# Patient Record
Sex: MALE | Race: WHITE | Employment: OTHER | URBAN - METROPOLITAN AREA
[De-identification: names, ages, dates, MRNs, and addresses within clinical notes are randomized per-mention and may not be internally consistent; named-entity substitution may affect disease eponyms.]

---

## 2023-02-09 ENCOUNTER — HOSPITAL ENCOUNTER (EMERGENCY)
Age: 78
Discharge: HOME | End: 2023-02-09
Attending: EMERGENCY MEDICINE
Payer: OTHER GOVERNMENT

## 2023-02-09 VITALS
OXYGEN SATURATION: 95 % | HEART RATE: 70 BPM | BODY MASS INDEX: 25.76 KG/M2 | DIASTOLIC BLOOD PRESSURE: 99 MMHG | HEIGHT: 68 IN | TEMPERATURE: 98.8 F | RESPIRATION RATE: 20 BRPM | SYSTOLIC BLOOD PRESSURE: 150 MMHG | WEIGHT: 170 LBS

## 2023-02-09 DIAGNOSIS — M79.661 RIGHT CALF PAIN: Primary | ICD-10-CM

## 2023-02-09 PROCEDURE — 99283 EMERGENCY DEPT VISIT LOW MDM: CPT

## 2023-02-09 RX ORDER — HYDROCODONE BITARTRATE AND ACETAMINOPHEN 5; 325 MG/1; MG/1
1 TABLET ORAL EVERY 6 HOURS PRN
Qty: 10 TABLET | Refills: 0 | Status: SHIPPED | OUTPATIENT
Start: 2023-02-09 | End: 2023-02-12

## 2023-02-09 ASSESSMENT — PAIN - FUNCTIONAL ASSESSMENT
PAIN_FUNCTIONAL_ASSESSMENT: NONE - DENIES PAIN
PAIN_FUNCTIONAL_ASSESSMENT: 0-10

## 2023-02-09 ASSESSMENT — PAIN SCALES - GENERAL: PAINLEVEL_OUTOF10: 8

## 2023-02-09 ASSESSMENT — LIFESTYLE VARIABLES
HOW OFTEN DO YOU HAVE A DRINK CONTAINING ALCOHOL: NEVER
HOW MANY STANDARD DRINKS CONTAINING ALCOHOL DO YOU HAVE ON A TYPICAL DAY: PATIENT DOES NOT DRINK

## 2023-02-09 ASSESSMENT — ENCOUNTER SYMPTOMS
RESPIRATORY NEGATIVE: 1
GASTROINTESTINAL NEGATIVE: 1

## 2023-02-09 NOTE — ED TRIAGE NOTES
Pain in the right leg. Pain in the calf in the leg. Patient takes elevated BP medication. Patient took all med's today. US taken about a month ago.

## 2023-02-09 NOTE — DISCHARGE INSTRUCTIONS
Call the [facility] for close follow-up. Also call [facility] orthopedics for follow-up. Return if worsening.

## 2023-02-09 NOTE — ED NOTES
I have reviewed discharge instructions with the patient. The patient verbalized understanding. Patient left ED via Discharge Method: ambulatory to Home with (insert name of family/friend, self, transport family). Opportunity for questions and clarification provided. Patient given 1 scripts. To continue your aftercare when you leave the hospital, you may receive an automated call from our care team to check in on how you are doing. This is a free service and part of our promise to provide the best care and service to meet your aftercare needs. \" If you have questions, or wish to unsubscribe from this service please call [phone]. Thank you for Choosing our [facility] Emergency Department.         Everett Ramirez RN  02/09/23 [id number]

## 2023-02-10 NOTE — ED PROVIDER NOTES
Emergency Department Provider Note                   PCP:                None None               Age: 78 y.o. Sex: male       ICD-10-CM    1. Right calf pain  M79.661 HYDROcodone-acetaminophen (NORCO) 5-325 MG per tablet     [address] [place] [address], [facility] Dr Amanda Cruz Decision To Discharge 02/09/2023 04:35:46 PM        Medical Decision Making  Patient is 26-year-old male who presents with right calf pain for about 4 months. He is already had a negative DVT study and a negative arterial study. I have reviewed his arterial report, venous report is not available for review. Patient is nontender on exam with no swelling. Both pedal pulses intact bilaterally. Discussed case with Dr. Jose Gu.  Discussed history, prior evaluations including arterial Doppler results. Suggested no further work-up indicated in the emergency department at this time. Recommended to treat pain and have him follow-up with the [place]. I have placed a referral to Ortho per patient's request. Patient is comfortable with and agreeable to plan. Will write for short course of pain meds. He is to return immediately if worsening in any way. Risk  Prescription drug management. Orders Placed This Encounter   Procedures    [address] [place] [address], [employer]         Medications - No data to display    Discharge Medication List as of 2/9/2023  4:40 PM        START taking these medications    Details   HYDROcodone-acetaminophen (NORCO) 5-325 MG per tablet Take 1 tablet by mouth every 6 hours as needed for Pain for up to 3 days. Intended supply: 3 days.  Take lowest dose possible to manage pain Max Daily Amount: 4 tablets, Disp-10 tablet, R-0Print              Irma Culver is a 78 y.o. male who presents to the Emergency Department with chief complaint of    Chief Complaint   Patient presents with    Leg Pain     right      Patient is a 44-year-old male with multiple chronic medical problems including AAA status postrepair, CABG, hypertension. He presents with right calf pain that been present since September. Feels fine at rest but worse when he walks. Denies any injury. He has reportedly been evaluated at the [facility] for DVT and had an arterial Doppler beginning of January. Both of these tests were reportedly negative. I can review arterial Dopplers but not the venous Doppler study. Patient called the [address] [place] [address] and told him he was having pain last night and they told him to come here for evaluation. Review of Systems   Constitutional: Negative. HENT: Negative. Respiratory: Negative. Cardiovascular: Negative. Gastrointestinal: Negative. Genitourinary: Negative. Musculoskeletal:  Positive for myalgias. All other systems reviewed and are negative. No past medical history on file. No past surgical history on file. No family history on file. Social History     Socioeconomic History    Marital status:          Patient has no known allergies. Discharge Medication List as of 2/9/2023  4:40 PM           Vitals signs and nursing note reviewed. No data found. Physical Exam  Vitals and nursing note reviewed. Constitutional:       General: He is not in acute distress. Appearance: Normal appearance. He is normal weight. He is not ill-appearing or toxic-appearing. HENT:      Head: Normocephalic. Eyes:      Extraocular Movements: Extraocular movements intact. Cardiovascular:      Rate and Rhythm: Normal rate and regular rhythm. Pulses: Normal pulses. Heart sounds: Normal heart sounds. Pulmonary:      Effort: Pulmonary effort is normal.      Breath sounds: Normal breath sounds. Musculoskeletal:         General: No swelling or tenderness. Normal range of motion. Cervical back: Normal range of motion and neck supple. Skin:     General: Skin is warm and dry.       Findings: No rash. Neurological:      General: No focal deficit present. Mental Status: He is alert and oriented to person, place, and time. Mental status is at baseline. Psychiatric:         Mood and Affect: Mood normal.         Behavior: Behavior normal.         Thought Content: Thought content normal.        Procedures    No results found for any visits on 02/09/23. No orders to display                       Voice dictation software was used during the making of this note. This software is not perfect and grammatical and other typographical errors may be present. This note has not been completely proofread for errors.        OLAMIDE Payton  02/09/23 [address] [place] [address]  02/10/23 [id number]